# Patient Record
(demographics unavailable — no encounter records)

---

## 2025-02-13 NOTE — CHIEF COMPLAINT
[Urgent Visit] : Urgent Visit [FreeTextEntry1] : Patient is a 60 yo female here today for bladder prolapse.  SHe has hx of [facility] MUS in 2019  she denies any PEÑA but complains of UUI symptoms.  states symptoms were exacerbated by helping her daughter move now her symptoms are resolved/

## 2025-02-13 NOTE — DISCUSSION/SUMMARY
[FreeTextEntry1] : discussed watchful waiting since symptoms are tolerable today she is to follow up in may for annual visit to discuss with dr. rodriguez. Plan UDS all of her questions were answered she is agreeable with plan

## 2025-05-31 NOTE — PLAN
[FreeTextEntry1] : Patient to follow up in 1 year for annual GYN exam Mammogram due: now Colonoscopy due: 07/26 Bone density due: now Pap ordered Hemoccult ordered All questions answered, patient is agreeable with plan   I Ilene Mao [facility] am scribing for the presence of Dr. Coats the following sections HISTORY OF PRESENT ILLNESS, PAST MEDICAL/FAMILY/SOCIAL HISTORY; REVIEW OF SYSTEMS; VITAL SIGNS; PHYSICAL EXAM; DISPOSITION. I personally performed the services described in the documentation, reviewed the documentation recorded by the scribe in my presence and it accurately and completely records my words and actions.

## 2025-05-31 NOTE — PLAN
[FreeTextEntry1] : Patient to follow up in 1 year for annual GYN exam Mammogram due: now Colonoscopy due: 07/26 Bone density due: now Pap ordered Hemoccult ordered All questions answered, patient is agreeable with plan   I Ilene Mao [facility] am scribing for the presence of Dr. Coats the following sections HISTORY OF PRESENT ILLNESS, PAST MEDICAL/FAMILY/SOCIAL HISTORY; REVIEW OF SYSTEMS; VITAL SIGNS; PHYSICAL EXAM; DISPOSITION. I personally performed the services described in the documentation, reviewed the documentation recorded by the scribe in my presence and it accurately and completely records my words and actions.

## 2025-05-31 NOTE — HISTORY OF PRESENT ILLNESS
[FreeTextEntry1] : Patient is a 62 yo female here today for annual visit. She denies any GYN complaints at this time. Urodynamics revealed -PEÑA,-UUI or -DO  hx of [facility] BS & MUS (08/2019).

## 2025-05-31 NOTE — HISTORY OF PRESENT ILLNESS
[FreeTextEntry1] : Patient is a 60 yo female here today for annual visit. She denies any GYN complaints at this time. Urodynamics revealed -PEÑA,-UUI or -DO  hx of [facility] BS & MUS (08/2019).

## 2025-05-31 NOTE — PHYSICAL EXAM
[Appropriately responsive] : appropriately responsive [Alert] : alert [No Acute Distress] : no acute distress [No Lymphadenopathy] : no lymphadenopathy [Soft] : soft [Non-tender] : non-tender [Non-distended] : non-distended [No HSM] : No HSM [No Lesions] : no lesions [No Mass] : no mass [Oriented x3] : oriented x3 [Examination Of The Breasts] : a normal appearance [No Masses] : no breast masses were palpable [Labia Majora] : normal [Labia Minora] : normal [Normal] : normal [Absent] : absent [Normal rectal exam] : was normal [FreeTextEntry2] : Catarino FNP-BC [Occult Blood Positive] : was negative for occult blood analysis [FreeTextEntry4] : BA 0 -1

## 2025-07-22 NOTE — PLAN
[FreeTextEntry1] : Plan: Advised patient to continue 1200mg of calcium supplementation as well as vitamin d3. Educated on weight bearing exercises as well as aerobic exercises. Patient agrees with plan. Patient to f/u for annual GYN exam and will do bone density in serial manner. Plan to start boniva 150mg monthly.  discussed proper administration of boniva. she is to take boniva on an empty stomach first thing in the am. she is to avoid laying down, eating, drinking, or taking other medications 1 hour after medication she is to call me if she has any side effects. risks and benefits and s/e discussed in detail she is to take calcium and vit d and exercise.   I Victor M PRINGLE am scribing for the presence of Dr. Nilo Coats the following sections HISTORY OF PRESENT ILLNESS, PAST MEDICAL/FAMILY/SOCIAL HISTORY; REVIEW OF SYSTEMS; VITAL SIGNS; PHYSICAL EXAM; DISPOSITION. I personally performed the services described in the documentation, reviewed the documentation recorded by the scribe in my presence and it accurately and completely records my words and actions.

## 2025-07-22 NOTE — HISTORY OF PRESENT ILLNESS
[FreeTextEntry1] : 61 year old female presents today for a follow up to review bone density result. T scores AP spine: -2.7 Left femoral neck:-2.1 Total left hip: -1.9 Showing osteoporosis Osteo Labs obtained and were wnl.